# Patient Record
Sex: MALE
[De-identification: names, ages, dates, MRNs, and addresses within clinical notes are randomized per-mention and may not be internally consistent; named-entity substitution may affect disease eponyms.]

---

## 2019-07-07 ENCOUNTER — HOSPITAL ENCOUNTER (EMERGENCY)
Dept: HOSPITAL 56 - MW.ED | Age: 44
Discharge: HOME | End: 2019-07-07
Payer: COMMERCIAL

## 2019-07-07 DIAGNOSIS — M25.512: ICD-10-CM

## 2019-07-07 DIAGNOSIS — F17.210: ICD-10-CM

## 2019-07-07 DIAGNOSIS — G89.29: Primary | ICD-10-CM

## 2019-07-11 NOTE — EDM.PDOC
ED HPI GENERAL MEDICAL PROBLEM





- General


Chief Complaint: Upper Extremity Injury/Pain


Stated Complaint: HURT SHOULDER


Time Seen by Provider: 07/07/19 07:56


Source of Information: Reports: Patient


History Limitations: Reports: No Limitations





- History of Present Illness


INITIAL COMMENTS - FREE TEXT/NARRATIVE: 


History of present illness:


Patient to ED with complaint of left shoulder pain for 3 days. Denies injury or 

trauma. 





Review of systems: 


As per history of present illness and below otherwise all systems reviewed and 

negative.





Past medical history: 


As per history of present illness and as reviewed below otherwise 

noncontributory.





Surgical history: 


As per history of present illness and as reviewed below otherwise 

noncontributory.





Social history: 


No reported history of drug or alcohol abuse.





Family history: 


As per history of present illness and as reviewed below otherwise 

noncontributory.





Physical exam:


General: Well developed, well nourished in NAD


HEENT: Atraumatic, normocephalic, pupils reactive, negative for conjunctival 

pallor or scleral icterus, mucous membranes moist, throat clear, neck supple, 

nontender, trachea midline.


Lungs: Clear to auscultation, breath sounds equal bilaterally, chest nontender.


Heart: S1S2, regular, negative for clicks, rubs, or JVD.


Abdomen: NABS, Soft, nondistended, nontender. Negative for masses or 

hepatosplenomegaly. Negative for costovertebral tenderness.


Pelvis: Stable nontender.


Genitourinary: Deferred.


Rectal: Deferred.


Extremities: Atraumatic, negative for cords or calf pain. Neurovascular 

unremarkable.


Neuro: Awake, alert, oriented. Cranial nerves II through XII unremarkable. 

Cerebellum unremarkable. Motor and sensory unremarkable throughout. Exam 

nonfocal.


Skin:warm and dry





Diagnostics:


Shoulder x-ray-refused





Therapeutics:


Toradol, arm sling





ED Course:


Stable





Impression: 


Chronic shoulder pain





Prescriptions:


Tramadol





Plan:


Take meds as directed, follow up with your primary care physician, return to ER 

if symptoms worsen or change.





Definitive disposition and diagnosis as appropriate pending reevaluation and 

review of above.





  ** left shoulder


Pain Score (Numeric/FACES): 6





- Related Data


 Allergies











Allergy/AdvReac Type Severity Reaction Status Date / Time


 


No Known Allergies Allergy   Verified 07/07/19 08:09











Home Meds: 


 Home Meds





traMADol HCl [Tramadol HCl] 50 mg PO Q6H PRN #16 tablet 07/07/19 [Rx]











Past Medical History





- Past Health History


Medical/Surgical History: Denies Medical/Surgical History





- Infectious Disease History


Infectious Disease History: Reports: Chicken Pox





Social & Family History





- Family History


Family Medical History: Noncontributory





- Tobacco Use


Smoking Status *Q: Current Every Day Smoker


Years of Tobacco use: 10


Packs/Tins Daily: 1





- Recreational Drug Use


Recreational Drug Use: No





Review of Systems





- Review of Systems


Review Of Systems: See Below





ED EXAM, GENERAL





- Physical Exam


Exam: See Below





Course





- Vital Signs


Last Recorded V/S: 





 Last Vital Signs











Temp  97.1 F   07/07/19 08:10


 


Pulse  90   07/07/19 08:45


 


Resp  18   07/07/19 08:45


 


BP  165/126 H  07/07/19 08:45


 


Pulse Ox  98   07/07/19 08:45














- Orders/Labs/Meds


Meds: 





Medications














Discontinued Medications














Generic Name Dose Route Start Last Admin





  Trade Name Freq  PRN Reason Stop Dose Admin


 


Ketorolac Tromethamine  60 mg  07/07/19 08:13  07/07/19 08:23





  Toradol  IM  07/07/19 08:14  60 mg





  ONETIME ONE   Administration





     





     





     





     














Departure





- Departure


Time of Disposition: 18:58


Disposition: Home, Self-Care 01


Condition: Good


Clinical Impression: 


Shoulder pain


Qualifiers:


 Chronicity: chronic Laterality: left Qualified Code(s): M25.512 - Pain in left 

shoulder; G89.29 - Other chronic pain








- Discharge Information


*PRESCRIPTION DRUG MONITORING PROGRAM REVIEWED*: No


*COPY OF PRESCRIPTION DRUG MONITORING REPORT IN PATIENT ZHEN: No


Prescriptions: 


traMADol HCl [Tramadol HCl] 50 mg PO Q6H PRN #16 tablet


 PRN Reason: Pain


Instructions:  Shoulder Pain


Referrals: 


PCP,Not In Area [Primary Care Provider] - 


Forms:  ED Department Discharge


Additional Instructions: 


The following information is given to patients seen in the emergency department 

who are being discharged to home. This information is to outline your options 

for follow-up care. We provide all patients seen in our emergency department 

with a follow-up referral.





The need for follow-up, as well as the timing and circumstances, are variable 

depending upon the specifics of your emergency department visit.





If you don't have a primary care physician on staff, we will provide you with a 

referral. We always advise you to contact your personal physician following an 

emergency department visit to inform them of the circumstance of the visit and 

for follow-up with them and/or the need for any referrals to a consulting 

specialist.





The emergency department will also refer you to a specialist when appropriate. 

This referral assures that you have the opportunity for follow-up care with a 

specialist. All of these measure are taken in an effort to provide you with 

optimal care, which includes your follow-up.





Under all circumstances we always encourage you to contact your private 

physician who remains a resource for coordinating your care. When calling for 

follow-up care, please make the office aware that this follow-up is from your 

recent emergency room visit. If for any reason you are refused follow-up, 

please contact the [facility] Emergency 

Department at [phone] and asked to speak to the emergency department 

charge nurse.





Take meds as directed, follow up with your primary care physician, return to ER 

if symptoms worsen or change.





[facility]


Primary Care


[address] [place] [address]


Phone: [phone]


Fax: [phone]

## 2021-07-14 ENCOUNTER — HOSPITAL ENCOUNTER (EMERGENCY)
Dept: HOSPITAL 56 - MW.ED | Age: 46
Discharge: LEFT BEFORE DISCHARGE | End: 2021-07-14
Payer: COMMERCIAL

## 2021-07-14 DIAGNOSIS — R07.89: Primary | ICD-10-CM

## 2021-07-14 LAB
BUN SERPL-MCNC: 18 MG/DL (ref 7–18)
CHLORIDE SERPL-SCNC: 105 MMOL/L (ref 98–107)
CO2 SERPL-SCNC: 29.3 MMOL/L (ref 21–32)
GLUCOSE SERPL-MCNC: 101 MG/DL (ref 74–106)
POTASSIUM SERPL-SCNC: 3.6 MMOL/L (ref 3.5–5.1)
SODIUM SERPL-SCNC: 144 MMOL/L (ref 136–148)

## 2021-07-14 PROCEDURE — 84484 ASSAY OF TROPONIN QUANT: CPT

## 2021-07-14 PROCEDURE — 99285 EMERGENCY DEPT VISIT HI MDM: CPT

## 2021-07-14 PROCEDURE — 83735 ASSAY OF MAGNESIUM: CPT

## 2021-07-14 PROCEDURE — 71045 X-RAY EXAM CHEST 1 VIEW: CPT

## 2021-07-14 PROCEDURE — 93005 ELECTROCARDIOGRAM TRACING: CPT

## 2021-07-14 PROCEDURE — 80053 COMPREHEN METABOLIC PANEL: CPT

## 2021-07-14 PROCEDURE — 36415 COLL VENOUS BLD VENIPUNCTURE: CPT

## 2021-07-14 PROCEDURE — 85025 COMPLETE CBC W/AUTO DIFF WBC: CPT

## 2021-07-14 NOTE — CR
For Patients:  As a result of the 21st Century Cures Act, medical imaging 

exams and procedure reports are released immediately into your electronic 

medical record.  You may view this report before your referring provider.  

If you have questions, please contact your health care provider.



INDICATION: chest pain



TECHNIQUE:



Chest 1 view. 



COMPARISON:



None. 



FINDINGS:



Cardiovascular and mediastinum: Heart size and vasculature are normal in 

caliber and appearance.  Mediastinum is within normal limits.  



Lungs and pleural space: Lungs are clear.  No sign of infiltrate or mass.  

No sign of pleural effusion.  No pneumothorax.  



Bones and soft tissues: No significant findings.  



IMPRESSION:



Unremarkable chest.



Dictated by: Rick Medina MD @ 07/14/2021 17:47:20



(Electronically Signed)

## 2021-07-14 NOTE — EDM.PDOC
<Lincoln Washington - Last Filed: 07/14/21 18:25>





ED HPI GENERAL MEDICAL PROBLEM





- General


Chief Complaint: Chest Pain


Stated Complaint: CHEST PAINS


Time Seen by Provider: 07/14/21 16:45





- History of Present Illness


INITIAL COMMENTS - FREE TEXT/NARRATIVE: 





History of present illness:


[] The patient had chest pain at about 4:30 PM today.  He was in normal 

activity.  It lasted 1 minute.  It was severe in the center of his retrosternal 

area.  It was associated with diaphoresis but no shortness of breath or nausea. 

 The pain went away on its own.  Nothing made it better or worse.  He is never 

had it before.





The patient smokes.  He is treated for blood pressure.  He is not diabetic.  He 

does not have a family history of heart attack stroke and he is never had 

anything like that.





Review of systems: 


As per history of present illness and below otherwise all systems reviewed and 

negative.





Past medical history: 


As per history of present illness and as reviewed below otherwise 

noncontributory.





Surgical history: 


As per history of present illness and as reviewed below otherwise 

noncontributory.





Social history: 


No reported history of drug or alcohol abuse.





Family history: 


As per history of present illness and as reviewed below otherwise 

noncontributory.





Physical exam:


Constitutional - well developed, well-nourished and in no acute distress


HEENT - normocephalic, no evidence of trauma - external nose and mouth normal - 

no mass in neck and no JVD - mucosae moist





EYES - full EOM, PERRL, no icterus - no evidence of inflammation, injection, or 

drainage





Respiratory - no respiratory distress, equal bilateral expansion, lungs clear to

 auscultation and no abnormal lung sounds





Cardiovascular - Regular Rhythm with S1 and S2 appreciated and no murmur, gallop

 or rub.





GI - abdomen soft without distension or organomegaly - normal bowel sounds - no 

guard or rebound





Musculoskeletal  no gross deformity of long bones or joints - no tenderness, 

swelling or edema





Neurologic - Alert and oriented times four - CN II-XII grossly intact - motor 

sensory and coordination symmetrically normal





Psychiatric - appropriate mood and affect with normal thought content





Hematologic - No petechiae or purpura - mucosa appropriate color and sclera not 

pale - normal nail bed color and refill





Integument - no rash or evidence of trauma - normal turgor-patient is 

diaphoretic still





Diagnostics:


[]





Therapeutics:


[]





Impression: 


[]





Plan:


[]





Definitive disposition and diagnosis as appropriate pending reevaluation and 

review of above.











- Related Data


                                    Allergies











Allergy/AdvReac Type Severity Reaction Status Date / Time


 


No Known Allergies Allergy   Verified 07/14/21 16:54











Home Meds: 


                                    Home Meds





Irbesartan/Hydrochlorothiazide [Irbesartan-Hctz 150-12.5 mg Tb]  07/14/21 

[History]











Past Medical History





- Past Health History


Medical/Surgical History: Denies Medical/Surgical History





- Infectious Disease History


Infectious Disease History: Reports: Chicken Pox





Social & Family History





- Family History


Family Medical History: No Pertinent Family History





ED ROS GENERAL





- Review of Systems


Review Of Systems: Comprehensive ROS is negative, except as noted in HPI.





ED EXAM, GENERAL





- Physical Exam


Exam: See Below


Free Text/Narrative:: 





My physical exam is in the HPI


  ** #1 Interpretation


EKG Interpretation Comments: 





EKG at 1650 hrs. shows a sinus rhythm with a heart rate of 85 the VA interval 

156 and QT duration of 428 and axis -29.  There is a late transition R wave 

because in the leftward axis.  Impression this is otherwise normal EKG and there

 is no prior for comparison





Departure





- Departure


Disposition: Against Medical Advice 07


Condition: Good


Clinical Impression: 


 Atypical chest pain, Left against medical advice








- Discharge Information


Instructions:  Nonspecific Chest Pain, Adult, Easy-to-Read


Referrals: 


PCP,None [Primary Care Provider] - 


Forms:  ED Department Discharge


Additional Instructions: 


Because of the history of smoking and blood pressure you should probably follow-

up be evaluated by cardiologist within a couple of weeks.





[facility] - cardiology


[address] [place] [id number]


Phone: [phone]


Fax: [phone]








The following information is given to patients seen in the emergency department 

who are being discharged to home. This information is to outline your options 

for follow-up care. We provide all patients seen in our emergency department 

with a follow-up referral.





The need for follow-up, as well as the timing and circumstances, are variable 

depending upon the specifics of your emergency department visit.





If you don't have a primary care physician on staff, we will provide you with a 

referral. We always advise you to contact your personal physician following an 

emergency department visit to inform them of the circumstance of the visit and 

for follow-up with them and/or the need for any referrals to a consulting 

specialist.





The emergency department will also refer you to a specialist when appropriate. 

This referral assures that you have the opportunity for follow-up care with a 

specialist. All of these measure are taken in an effort to provide you with o

ptimal care, which includes your follow-up.





Under all circumstances we always encourage you to contact your private 

physician who remains a resource for coordinating your care. When calling for 

follow-up care, please make the office aware that this follow-up is from your 

recent emergency room visit. If for any reason you are refused follow-up, please

contact the [facility] Emergency Department

at [phone] and asked to speak to the emergency department charge nurse.








<Thien Chan E - Last Filed: 07/14/21 19:42>





ED HPI GENERAL MEDICAL PROBLEM





- History of Present Illness


INITIAL COMMENTS - FREE TEXT/NARRATIVE: 


Patient is a 46-year-old male who presented initially to the emergency room with

complaints of brief chest pain.  He states that upon arrival his chest pain had 

resolved.  Patient initially was evaluated and seen by Dr. Washington.  Patient's lab

work, including troponin is unremarkable.  His chest x-ray is unremarkable.  Dr. Washington had spoke with the patient about getting a second troponin at the 3-hour 

mike.  Initially the patient was agreeable.  At 1940 the patient reports he is 

not able to "wait a minute longer".  He states he still chest pain-free.  He 

will sign out AGAINST MEDICAL ADVICE.  Vital signs remained stable.  He was 

encouraged to follow-up with primary care and/or cardiology if needed signs and 

symptoms that would prompt him to return to the emergency room were reviewed and

discussed.





ED ROS GENERAL





- Review of Systems


Review Of Systems: Comprehensive ROS is negative, except as noted in HPI.





ED EXAM, GENERAL





- Physical Exam


Exam: See Below (See dictation)





Course





- Vital Signs


Last Recorded V/S: 





                                Last Vital Signs











Temp  97.4 F   07/14/21 16:51


 


Pulse  88   07/14/21 18:39


 


Resp  18   07/14/21 18:39


 


BP  146/81 H  07/14/21 18:39


 


Pulse Ox  98   07/14/21 18:39














- Orders/Labs/Meds


Orders: 





                               Active Orders 24 hr











 Category Date Time Status


 


 EKG Documentation Completion [RC] AM Care  07/14/21 16:49 Active


 


 TROPONIN I [CHEM] Routine Lab  07/14/21 20:00 Ordered


 


 Sodium Chloride 0.9% [Saline Flush] Med  07/14/21 16:49 Active





 10 ml FLUSH ASDIRECTED PRN   


 


 Sodium Chloride 0.9% [Saline Flush] Med  07/14/21 16:49 Active





 2.5 ml FLUSH ASDIRECTED PRN   


 


 Saline Lock Insert [OM.PC] Stat Oth  07/14/21 16:49 Ordered








                                Medication Orders





Sodium Chloride (Sodium Chloride 0.9% 10 Ml Syringe)  10 ml FLUSH ASDIRECTED PRN


   PRN Reason: Keep Vein Open


   Last Admin: 07/14/21 17:07  Dose: 10 ml


   Documented by: CLAIRE


Sodium Chloride (Sodium Chloride 0.9% 2.5 Ml Syringe)  2.5 ml FLUSH ASDIRECTED 

PRN


   PRN Reason: Keep Vein Open


   Last Admin: 07/14/21 17:07  Dose: 2.5 ml


   Documented by: CLAIRE








Labs: 





                                Laboratory Tests











  07/14/21 07/14/21 Range/Units





  16:49 16:49 


 


WBC  9.92   (4.0-11.0)  K/uL


 


RBC  5.38   (4.50-5.90)  M/uL


 


Hgb  16.4   (13.0-17.0)  g/dL


 


Hct  47.4   (38.0-50.0)  %


 


MCV  88.1   (80.0-98.0)  fL


 


MCH  30.5   (27.0-32.0)  pg


 


MCHC  34.6   (31.0-37.0)  g/dL


 


RDW Std Deviation  45.6   (28.0-62.0)  fl


 


RDW Coeff of Kevin  14   (11.0-15.0)  %


 


Plt Count  285   (150-400)  K/uL


 


MPV  9.10   (7.40-12.00)  fL


 


Neut % (Auto)  51.2   (48.0-80.0)  %


 


Lymph % (Auto)  34.4   (16.0-40.0)  %


 


Mono % (Auto)  11.7   (0.0-15.0)  %


 


Eos % (Auto)  2.4   (0.0-7.0)  %


 


Baso % (Auto)  0.3   (0.0-1.5)  %


 


Neut # (Auto)  5.1   (1.4-5.7)  K/uL


 


Lymph # (Auto)  3.4 H   (0.6-2.4)  K/uL


 


Mono # (Auto)  1.2 H   (0.0-0.8)  K/uL


 


Eos # (Auto)  0.2   (0.0-0.7)  K/uL


 


Baso # (Auto)  0.0   (0.0-0.1)  K/uL


 


Nucleated RBC %  0.0   /100WBC


 


Nucleated RBCs #  0   K/uL


 


Sodium   144  (136-148)  mmol/L


 


Potassium   3.6  (3.5-5.1)  mmol/L


 


Chloride   105  ()  mmol/L


 


Carbon Dioxide   29.3  (21.0-32.0)  mmol/L


 


BUN   18  (7.0-18.0)  mg/dL


 


Creatinine   1.0  (0.8-1.3)  mg/dL


 


Est Cr Clr Drug Dosing   99.35  mL/min


 


Estimated GFR (MDRD)   > 60.0  ml/min


 


Glucose   101  ()  mg/dL


 


Calcium   9.3  (8.5-10.1)  mg/dL


 


Magnesium   2.0  (1.8-2.4)  mg/dL


 


Total Bilirubin   0.3  (0.2-1.0)  mg/dL


 


AST   20  (15-37)  IU/L


 


ALT   57  (14-63)  IU/L


 


Alkaline Phosphatase   106  ()  U/L


 


Troponin I   < 0.050  (0.000-0.056)  ng/mL


 


Total Protein   7.7  (6.4-8.2)  g/dL


 


Albumin   3.9  (3.4-5.0)  g/dL


 


Globulin   3.8  (2.6-4.0)  g/dL


 


Albumin/Globulin Ratio   1.0  (0.9-1.6)  











Meds: 





Medications











Generic Name Dose Route Start Last Admin





  Trade Name Freq  PRN Reason Stop Dose Admin


 


Sodium Chloride  10 ml  07/14/21 16:49  07/14/21 17:07





  Sodium Chloride 0.9% 10 Ml Syringe  FLUSH   10 ml





  ASDIRECTED PRN   Administration





  Keep Vein Open  


 


Sodium Chloride  2.5 ml  07/14/21 16:49  07/14/21 17:07





  Sodium Chloride 0.9% 2.5 Ml Syringe  FLUSH   2.5 ml





  ASDIRECTED PRN   Administration





  Keep Vein Open  














Discontinued Medications














Generic Name Dose Route Start Last Admin





  Trade Name Freq  PRN Reason Stop Dose Admin


 


Aspirin  324 mg  07/14/21 16:49  07/14/21 17:07





  Aspirin 81 Mg Tab.Chew  PO  07/14/21 16:50  324 mg





  ONETIME ONE   Administration














Departure





- Departure


Time of Disposition: 19:41





Sepsis Event Note (ED)





- Focused Exam


Vital Signs: 





                                   Vital Signs











  Temp Pulse Resp BP Pulse Ox


 


 07/14/21 18:39   88  18  146/81 H  98


 


 07/14/21 17:42   76  20  162/104 H  98


 


 07/14/21 16:51  97.4 F  94  20  170/111 H  97

## 2024-12-30 ENCOUNTER — HOSPITAL ENCOUNTER (EMERGENCY)
Dept: HOSPITAL 56 - MW.ED | Age: 49
Discharge: HOME | End: 2024-12-30
Payer: COMMERCIAL

## 2024-12-30 DIAGNOSIS — Z79.899: ICD-10-CM

## 2024-12-30 DIAGNOSIS — I10: ICD-10-CM

## 2024-12-30 DIAGNOSIS — N20.0: Primary | ICD-10-CM

## 2024-12-30 LAB
APPEARANCE UR: (no result)
BACTERIA URNS QL MICRO: (no result)
BILIRUB UR STRIP-MCNC: NEGATIVE MG/DL
COLOR UR: YELLOW
EPI CELLS #/AREA URNS HPF: (no result) /[HPF]
GLUCOSE UR STRIP-MCNC: NEGATIVE MG/DL
KETONES UR STRIP-MCNC: NEGATIVE MG/DL
MUCOUS THREADS URNS QL MICRO: (no result)
NITRITE UR QL: POSITIVE
PH UR STRIP: 5.5 [PH] (ref 5–8)
PROT UR STRIP-MCNC: 30 MG/DL
RBC # URNS HPF: (no result) /ML
RBC UR QL: (no result)
SP GR UR STRIP: >= 1.03 (ref 1–1.03)
UROBILINOGEN UR STRIP-ACNC: 0.2 EU/DL (ref ?–2)
WBC UR QL: (no result)

## 2024-12-30 PROCEDURE — 81001 URINALYSIS AUTO W/SCOPE: CPT

## 2024-12-30 PROCEDURE — 99284 EMERGENCY DEPT VISIT MOD MDM: CPT
